# Patient Record
Sex: MALE
[De-identification: names, ages, dates, MRNs, and addresses within clinical notes are randomized per-mention and may not be internally consistent; named-entity substitution may affect disease eponyms.]

---
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
## 2020-02-26 ENCOUNTER — TRANSFERRED RECORDS (OUTPATIENT)
Dept: HEALTH INFORMATION MANAGEMENT | Facility: CLINIC | Age: 20
End: 2020-02-26

## 2020-03-05 ENCOUNTER — MEDICAL CORRESPONDENCE (OUTPATIENT)
Dept: HEALTH INFORMATION MANAGEMENT | Facility: CLINIC | Age: 20
End: 2020-03-05

## 2020-03-05 ENCOUNTER — TRANSFERRED RECORDS (OUTPATIENT)
Dept: HEALTH INFORMATION MANAGEMENT | Facility: CLINIC | Age: 20
End: 2020-03-05

## 2020-03-10 ENCOUNTER — ANCILLARY PROCEDURE (OUTPATIENT)
Dept: CARDIOLOGY | Facility: CLINIC | Age: 20
End: 2020-03-10
Attending: INTERNAL MEDICINE
Payer: COMMERCIAL

## 2020-03-10 DIAGNOSIS — I44.0 FIRST DEGREE AV BLOCK: ICD-10-CM

## 2020-03-13 NOTE — TELEPHONE ENCOUNTER
RECORDS RECEIVED FROM: Internal/External   DATE RECEIVED: 3-16   NOTES STATUS DETAILS   OFFICE NOTE from referring provider    Received 3-5-20 [place]   OFFICE NOTE from other cardiologist    N/A    DISCHARGE SUMMARY from hospital    N/A    DISCHARGE REPORT from the ER   N/A    OPERATIVE REPORT    N/A    MEDICATION LIST   N/A    LABS     BMP   Received 3-5-20 [place] from [place]   CBC   N/A    CMP   Received 3-4-20 [place] from [place]   Lipids   N/A    TSH   Received 3-5-20 [place]anned in from [place]   DIAGNOSTIC PROCEDURES     EKG   Received 2-26-20 [place]   Monitor Reports   N/A    IMAGING (DISC & REPORT)      Echo   Internal 3-10-20   Stress Tests   N/A    Cath   N/A    MRI/MRA   N/A    CT/CTA   N/A

## 2020-03-16 ENCOUNTER — PRE VISIT (OUTPATIENT)
Dept: CARDIOLOGY | Facility: CLINIC | Age: 20
End: 2020-03-16

## 2020-03-16 ENCOUNTER — VIRTUAL VISIT (OUTPATIENT)
Dept: CARDIOLOGY | Facility: CLINIC | Age: 20
End: 2020-03-16
Attending: INTERNAL MEDICINE
Payer: COMMERCIAL

## 2020-03-16 DIAGNOSIS — I44.0 FIRST DEGREE AV BLOCK: ICD-10-CM

## 2020-03-16 DIAGNOSIS — R06.09 DOE (DYSPNEA ON EXERTION): Primary | ICD-10-CM

## 2020-03-16 PROCEDURE — 99214 OFFICE O/P EST MOD 30 MIN: CPT | Mod: ZP | Performed by: INTERNAL MEDICINE

## 2020-03-16 RX ORDER — DEXAMETHASONE 4 MG/1
TABLET ORAL
COMMUNITY
Start: 2020-02-26

## 2020-03-16 RX ORDER — FLUTICASONE PROPIONATE 50 MCG
1 SPRAY, SUSPENSION (ML) NASAL DAILY
COMMUNITY

## 2020-03-16 NOTE — LETTER
"3/16/2020      RE: Calixto Jolley  [address] [facility] [address]       Dear Colleague,    Thank you for the opportunity to participate in the care of your patient, Calixto Jolley, at the [facility]. Please see a copy of my visit note below.    Calixto Jolley is a 19 year old male who is being evaluated via a billable telephone visit.      The patient has been notified of following:     \"This telephone visit will be conducted via a call between you and your physician/provider. We have found that certain health care needs can be provided without the need for a physical exam.  This service lets us provide the care you need with a short phone conversation.  If a prescription is necessary we can send it directly to your pharmacy.  If lab work is needed we can place an order for that and you can then stop by our lab to have the test done at a later time.    If during the course of the call the physician/provider feels a telephone visit is not appropriate, you will not be charged for this service.\"     Calixto Jolley is a 19 year old male with no significant PMH presented to his PCP Dr. Segal few weeks ago with complains of SOB and cough during exercise. He is referred to cardiology after his TTE showed borderline reduced EF at 50-55%. I have called him over the phone today to discuss his symptoms and echo result. Patient`s mother also joined the conversation through conference call.    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  He was at [employer] end of July last year.  He reports a lot of people with him got sick during that camp with fever and muscle weakness.  When he returned he was tested positive for strep throat and underwent treatment.  Since then the patient reports continued cough and shortness of breath mainly affecting his exercise routine.  Patient is an avid exerciser since sixth grade.  He runs, " lifts weights and does cardio exercise.  He has noticed that he is short of breath after exercise which is a new symptom since 8/2019.  Patient describes dry cough worse during and after exercise.  Patient has been using Flovent over the last 2 weeks but denies improvement in his symptoms.    Patient reports no limiting symptoms during his daily life.  He reports occasional episodes of resting shortness of breath sometimes lasting up to an hour.  Denies associated symptoms like chest pain, palpitations, dizziness, or syncope.  No lower extremity edema.    Patient does not smoke.  No alcohol.  No drugs.  No history of hypertension, diabetes.      I have reviewed and updated the patient's Past Medical History, Social History, Family History and Medication List.    ALLERGIES  Patient has no known allergies.      Echocardiogram: 3/11/2020  Borderline (EF 50-55%) reduced left ventricular function is present.  Right ventricular function, chamber size, wall motion, and thickness are  normal.  No significant valvular abnormalities were noted.  Previous study not available for comparison.    CBC 2/26/2020              ECG [facility]; Sinus rhythm, 1st degree AV block otherwise normal.      Assessment/Plan:  Calixto Jolley is a 19 year old male with no significant PMH.  Patient's major symptom is shortness of breath and dry cough during exercise. Patient`s symptoms started after his camp August of last year where he contracted an URI.  Patient's symptoms has been affecting his exercise routine but not his daily activities.  Patient denies relief from daily inhaler over the last 2 weeks. No other cardiac complaints. Patient's vital signs and initial lab tests (CBC, CMP, TSH) from [facility] visit reviewed and they are unremarkable.  EKG shows sinus rhythm with first-degree AV block otherwise unremarkable.     I have reviewed his echocardiogram which showed borderline reduced EF at 50 otherwise biventricular  chamber size and thickness are all normal. No regional wall motion abnormalities. No valvular disease noted.   I have discussed with the patient the findings on the echocardiogram and recommended cardiac MRI to further assess LV function and mildly low EF.  I also recommended exercise treadmill test. If cardiac MRI reveals normal EF, I would recommend PFTs to further assess SOB and cough with exercise.    I have reviewed the note as documented above.  This accurately captures the substance of my conversation with the patient.      Phone call contact time  Call Started at 12:10 3/16/2020  Call Ended at 12:40 3/16/2020    Helen GRIFFITHS MD  [facility] Division of Cardiology  Pager [phone]

## 2020-03-16 NOTE — PROGRESS NOTES
"Calixto Jolley is a 19 year old male who is being evaluated via a billable telephone visit.      The patient has been notified of following:     \"This telephone visit will be conducted via a call between you and your physician/provider. We have found that certain health care needs can be provided without the need for a physical exam.  This service lets us provide the care you need with a short phone conversation.  If a prescription is necessary we can send it directly to your pharmacy.  If lab work is needed we can place an order for that and you can then stop by our lab to have the test done at a later time.    If during the course of the call the physician/provider feels a telephone visit is not appropriate, you will not be charged for this service.\"     Calixto Jolley is a 19 year old male with no significant PMH presented to his PCP Dr. Segal few weeks ago with complains of SOB and cough during exercise. He is referred to cardiology after his TTE showed borderline reduced EF at 50-55%. I have called him over the phone today to discuss his symptoms and echo result. Patient`s mother also joined the conversation through conference call.    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  He was at [employer] end of July last year.  He reports a lot of people with him got sick during that camp with fever and muscle weakness.  When he returned he was tested positive for strep throat and underwent treatment.  Since then the patient reports continued cough and shortness of breath mainly affecting his exercise routine.  Patient is an avid exerciser since sixth grade.  He runs, lifts weights and does cardio exercise.  He has noticed that he is short of breath after exercise which is a new symptom since 8/2019.  Patient describes dry cough worse during and after exercise.  Patient has been using Flovent over the last 2 weeks but denies improvement in his symptoms.    Patient reports no limiting symptoms " during his daily life.  He reports occasional episodes of resting shortness of breath sometimes lasting up to an hour.  Denies associated symptoms like chest pain, palpitations, dizziness, or syncope.  No lower extremity edema.    Patient does not smoke.  No alcohol.  No drugs.  No history of hypertension, diabetes.      I have reviewed and updated the patient's Past Medical History, Social History, Family History and Medication List.    ALLERGIES  Patient has no known allergies.      Echocardiogram: 3/11/2020  Borderline (EF 50-55%) reduced left ventricular function is present.  Right ventricular function, chamber size, wall motion, and thickness are  normal.  No significant valvular abnormalities were noted.  Previous study not available for comparison.    CBC 2/26/2020              ECG [facility]; Sinus rhythm, 1st degree AV block otherwise normal.      Assessment/Plan:  Calixto Jolley is a 19 year old male with no significant PMH.  Patient's major symptom is shortness of breath and dry cough during exercise. Patient`s symptoms started after his camp August of last year where he contracted an URI.  Patient's symptoms has been affecting his exercise routine but not his daily activities.  Patient denies relief from daily inhaler over the last 2 weeks. No other cardiac complaints. Patient's vital signs and initial lab tests (CBC, CMP, TSH) from [facility] visit reviewed and they are unremarkable.  EKG shows sinus rhythm with first-degree AV block otherwise unremarkable.     I have reviewed his echocardiogram which showed borderline reduced EF at 50 otherwise biventricular chamber size and thickness are all normal. No regional wall motion abnormalities. No valvular disease noted.   I have discussed with the patient the findings on the echocardiogram and recommended cardiac MRI to further assess LV function and mildly low EF.  I also recommended exercise treadmill test. If cardiac MRI reveals normal  EF, I would recommend PFTs to further assess SOB and cough with exercise.    I have reviewed the note as documented above.  This accurately captures the substance of my conversation with the patient.      Phone call contact time  Call Started at 12:10 3/16/2020  Call Ended at 12:40 3/16/2020    Helen GRIFFITHS MD  [facility] Division of Cardiology  Pager [phone]

## 2020-05-04 ENCOUNTER — TELEPHONE (OUTPATIENT)
Dept: CARDIOLOGY | Facility: CLINIC | Age: 20
End: 2020-05-04

## 2020-05-04 NOTE — TELEPHONE ENCOUNTER
[facility] Call Center    Phone Message    May a detailed message be left on voicemail: yes     Reason for Call: Other: Pt calling in imaging needs new orders stating the pt needs the testing done asap, also pts insurance ends this month so he needs it done before the end of May , please call pt back to discuss      Action Taken: Message routed to:  [facility] ([facility] cardio    Travel Screening: Not Applicable

## 2020-05-04 NOTE — TELEPHONE ENCOUNTER
Response from Dr. Diaz :     From: Helen Diaz MD  Sent: 5/4/2020   2:48 PM CDT  To: Eliseo Banuelos LPN  Subject: RE: patient request                              This is not urgent. Can be done after COVID.    Patient notified.

## 2021-01-13 ENCOUNTER — HOSPITAL ENCOUNTER (OUTPATIENT)
Dept: MRI IMAGING | Facility: CLINIC | Age: 21
End: 2021-01-13
Attending: INTERNAL MEDICINE
Payer: COMMERCIAL

## 2021-01-13 ENCOUNTER — HOSPITAL ENCOUNTER (OUTPATIENT)
Dept: CARDIOLOGY | Facility: CLINIC | Age: 21
End: 2021-01-13
Attending: INTERNAL MEDICINE
Payer: COMMERCIAL

## 2021-01-13 DIAGNOSIS — I44.0 FIRST DEGREE AV BLOCK: ICD-10-CM

## 2021-01-13 PROCEDURE — 93017 CV STRESS TEST TRACING ONLY: CPT

## 2021-01-13 PROCEDURE — 93016 CV STRESS TEST SUPVJ ONLY: CPT | Performed by: INTERNAL MEDICINE

## 2021-01-13 PROCEDURE — 75561 CARDIAC MRI FOR MORPH W/DYE: CPT | Mod: 26 | Performed by: STUDENT IN AN ORGANIZED HEALTH CARE EDUCATION/TRAINING PROGRAM

## 2021-01-13 PROCEDURE — 75561 CARDIAC MRI FOR MORPH W/DYE: CPT

## 2021-01-13 PROCEDURE — 93018 CV STRESS TEST I&R ONLY: CPT | Performed by: INTERNAL MEDICINE

## 2021-01-13 PROCEDURE — A9585 GADOBUTROL INJECTION: HCPCS | Performed by: INTERNAL MEDICINE

## 2021-01-13 PROCEDURE — 255N000002 HC RX 255 OP 636: Performed by: INTERNAL MEDICINE

## 2021-01-13 RX ORDER — GADOBUTROL 604.72 MG/ML
10 INJECTION INTRAVENOUS ONCE
Status: COMPLETED | OUTPATIENT
Start: 2021-01-13 | End: 2021-01-13

## 2021-01-13 RX ADMIN — GADOBUTROL 10 ML: 604.72 INJECTION INTRAVENOUS at 08:44
